# Patient Record
Sex: FEMALE | Race: WHITE | NOT HISPANIC OR LATINO
[De-identification: names, ages, dates, MRNs, and addresses within clinical notes are randomized per-mention and may not be internally consistent; named-entity substitution may affect disease eponyms.]

---

## 2019-09-05 ENCOUNTER — APPOINTMENT (OUTPATIENT)
Dept: OTOLARYNGOLOGY | Facility: CLINIC | Age: 59
End: 2019-09-05
Payer: COMMERCIAL

## 2019-09-05 VITALS
HEART RATE: 88 BPM | BODY MASS INDEX: 25.76 KG/M2 | DIASTOLIC BLOOD PRESSURE: 83 MMHG | SYSTOLIC BLOOD PRESSURE: 126 MMHG | WEIGHT: 140 LBS | HEIGHT: 62 IN

## 2019-09-05 DIAGNOSIS — Z87.09 PERSONAL HISTORY OF OTHER DISEASES OF THE RESPIRATORY SYSTEM: ICD-10-CM

## 2019-09-05 DIAGNOSIS — Z91.09 OTHER ALLERGY STATUS, OTHER THAN TO DRUGS AND BIOLOGICAL SUBSTANCES: ICD-10-CM

## 2019-09-05 DIAGNOSIS — Z82.49 FAMILY HISTORY OF ISCHEMIC HEART DISEASE AND OTHER DISEASES OF THE CIRCULATORY SYSTEM: ICD-10-CM

## 2019-09-05 DIAGNOSIS — K21.9 GASTRO-ESOPHAGEAL REFLUX DISEASE W/OUT ESOPHAGITIS: ICD-10-CM

## 2019-09-05 DIAGNOSIS — Z87.891 PERSONAL HISTORY OF NICOTINE DEPENDENCE: ICD-10-CM

## 2019-09-05 DIAGNOSIS — Z78.9 OTHER SPECIFIED HEALTH STATUS: ICD-10-CM

## 2019-09-05 DIAGNOSIS — J31.0 CHRONIC RHINITIS: ICD-10-CM

## 2019-09-05 PROBLEM — Z00.00 ENCOUNTER FOR PREVENTIVE HEALTH EXAMINATION: Status: ACTIVE | Noted: 2019-09-05

## 2019-09-05 PROCEDURE — 31231 NASAL ENDOSCOPY DX: CPT

## 2019-09-05 PROCEDURE — 99213 OFFICE O/P EST LOW 20 MIN: CPT | Mod: 25

## 2019-09-05 RX ORDER — FLUTICASONE PROPIONATE 50 MCG
50 SPRAY, SUSPENSION NASAL
Refills: 0 | Status: ACTIVE | COMMUNITY

## 2019-09-05 RX ORDER — LEVOCETIRIZINE DIHYDROCHLORIDE 5 MG/1
TABLET, FILM COATED ORAL
Refills: 0 | Status: ACTIVE | COMMUNITY

## 2019-09-05 RX ORDER — PSEUDOEPHEDRINE HCL 30 MG
TABLET ORAL
Refills: 0 | Status: ACTIVE | COMMUNITY

## 2019-09-05 NOTE — HISTORY OF PRESENT ILLNESS
[de-identified] : ROMIE GIBSON is a 59 year patient With a history of allergies, chronic rhinitis, chronic sinusitis, and reflux. She has been doing well. She has mild allergy-type symptoms. She is using Flonase, Sudafed, and xyzal as needed. She is also on a PPI for reflux. She has not had a recent sinus infection. She does have mild intermittent swelling of the left submandibular gland duct. She said she had it treated by her ENT in [place] in the past. It has not happened that much.

## 2019-09-05 NOTE — ASSESSMENT
[FreeTextEntry1] : She has a history of chronic rhinitis, allergies, chronic sinusitis, and reflux. She has been doing well. Her exam is stable. She also reports mild intermittent swelling of the left submandibular gland duct. Her exam looked normal today\par \par Plan\par -Findings and management options were discussed with the patient.\par -Allergy medications as needed\par -Allergy management\par -Continue reflux precautions and medication for reflux as needed\par -I would like to see her if she has a sinus infection.\par -Hydration, sialagogues, and massage the submandibular gland as needed.  She will return if she has recurrent swelling. She may be a candidate for sialoendoscopy if she does\par -she will follow up in 6 months if she is doing well\par -call and return earlier if any problems.

## 2020-03-11 ENCOUNTER — TRANSCRIPTION ENCOUNTER (OUTPATIENT)
Age: 60
End: 2020-03-11

## 2020-03-12 ENCOUNTER — APPOINTMENT (OUTPATIENT)
Dept: OTOLARYNGOLOGY | Facility: CLINIC | Age: 60
End: 2020-03-12

## 2021-06-03 ENCOUNTER — NON-APPOINTMENT (OUTPATIENT)
Age: 61
End: 2021-06-03

## 2021-06-03 ENCOUNTER — APPOINTMENT (OUTPATIENT)
Dept: HEART AND VASCULAR | Facility: CLINIC | Age: 61
End: 2021-06-03
Payer: COMMERCIAL

## 2021-06-03 VITALS
OXYGEN SATURATION: 97 % | SYSTOLIC BLOOD PRESSURE: 123 MMHG | DIASTOLIC BLOOD PRESSURE: 84 MMHG | WEIGHT: 144 LBS | HEART RATE: 80 BPM | BODY MASS INDEX: 26.5 KG/M2 | HEIGHT: 62 IN

## 2021-06-03 DIAGNOSIS — I25.10 ATHEROSCLEROTIC HEART DISEASE OF NATIVE CORONARY ARTERY W/OUT ANGINA PECTORIS: ICD-10-CM

## 2021-06-03 PROCEDURE — 99203 OFFICE O/P NEW LOW 30 MIN: CPT

## 2021-06-03 PROCEDURE — 93000 ELECTROCARDIOGRAM COMPLETE: CPT

## 2021-06-03 PROCEDURE — 99072 ADDL SUPL MATRL&STAF TM PHE: CPT

## 2021-06-15 ENCOUNTER — APPOINTMENT (OUTPATIENT)
Dept: CT IMAGING | Facility: HOSPITAL | Age: 61
End: 2021-06-15
Payer: COMMERCIAL

## 2021-06-15 ENCOUNTER — OUTPATIENT (OUTPATIENT)
Dept: OUTPATIENT SERVICES | Facility: HOSPITAL | Age: 61
LOS: 1 days | End: 2021-06-15
Payer: COMMERCIAL

## 2021-06-15 PROCEDURE — 75574 CT ANGIO HRT W/3D IMAGE: CPT | Mod: 26

## 2021-06-15 PROCEDURE — 75574 CT ANGIO HRT W/3D IMAGE: CPT

## 2021-06-18 ENCOUNTER — TRANSCRIPTION ENCOUNTER (OUTPATIENT)
Age: 61
End: 2021-06-18

## 2021-06-18 ENCOUNTER — LABORATORY RESULT (OUTPATIENT)
Age: 61
End: 2021-06-18

## 2021-06-28 NOTE — ASSESSMENT
[FreeTextEntry1] : Ms. Burrell is a 61 yo woman with a history of CAD s/p PCI with 3.5 x 30 mm Resolute [place] DONI to LAD and 2.5 x 15 mm Resolute [place] DONI to LCX, HTN, HLD referred by Dr. Jackson for persistent chest pain post procedure. She had a cath in [place] with stent placement. She had a brief episode of chest pressure at that time (March 2021) and went to the hospital in [place]. Post procedure she has been having chest "soreness" over the center of her chest, improves with tylenol, worsens with palpation, not pleuritic in nature. It worsens with exercise like fast paced walking. No associated SOB, diaphoresis, palpitations. \par \par Chest pain: Atypical anginal symptoms, similar to chest pain prior to stent, will get CCTA to evaluate for recurrent obstructive CAD. Will also check P2Y12 platelet response test for prasugrel. Continue current antianginal regimen.\par HLD: Continue statin\par Endo: HbA1C 5.8%, continue lifestyle changes including diet and exercise, f/u with PCP\par \par Follow up in 1 month after CCTA

## 2021-06-28 NOTE — HISTORY OF PRESENT ILLNESS
[FreeTextEntry1] : Ms. Burrell is a 59 yo woman with a history of CAD s/p PCI with 3.5 x 30 mm Resolute [place] DONI to LAD and 2.5 x 15 mm Resolute [place] DONI to LCX, HTN, HLD referred by Dr. Jackson for persistent chest pain post procedure. She had a cath in [place] with stent placement. She had a brief episode of chest pressure at that time (March 2021) and went to the hospital in [place]. Post procedure she has been having chest "soreness" over the center of her chest, improves with tylenol, worsens with palpation, not pleuritic in nature. It worsens with exercise like fast paced walking. No associated SOB, diaphoresis, palpitations. \par \par \par Meds: ASA 81 mg, Prasugrel 10 mg daily, Metoprolol tartrate 12.5 mg daily, Ranolazine 1000 mg bid, Rosuvastatin 40 mg daily

## 2021-10-07 ENCOUNTER — TRANSCRIPTION ENCOUNTER (OUTPATIENT)
Age: 61
End: 2021-10-07

## 2022-09-07 ENCOUNTER — NON-APPOINTMENT (OUTPATIENT)
Age: 62
End: 2022-09-07